# Patient Record
Sex: MALE | Race: BLACK OR AFRICAN AMERICAN | Employment: UNEMPLOYED | URBAN - METROPOLITAN AREA
[De-identification: names, ages, dates, MRNs, and addresses within clinical notes are randomized per-mention and may not be internally consistent; named-entity substitution may affect disease eponyms.]

---
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
## 2024-06-05 ENCOUNTER — OFFICE VISIT (OUTPATIENT)
Dept: PEDIATRICS | Facility: CLINIC | Age: 19
End: 2024-06-05
Payer: COMMERCIAL

## 2024-06-05 VITALS
OXYGEN SATURATION: 99 % | TEMPERATURE: 98.4 F | DIASTOLIC BLOOD PRESSURE: 70 MMHG | HEART RATE: 66 BPM | SYSTOLIC BLOOD PRESSURE: 117 MMHG | WEIGHT: 183.6 LBS

## 2024-06-05 DIAGNOSIS — Z71.84 TRAVEL ADVICE ENCOUNTER: Primary | ICD-10-CM

## 2024-06-05 PROCEDURE — 99401 PREV MED CNSL INDIV APPRX 15: CPT | Mod: 25 | Performed by: PEDIATRICS

## 2024-06-05 PROCEDURE — 90471 IMMUNIZATION ADMIN: CPT | Performed by: PEDIATRICS

## 2024-06-05 PROCEDURE — 90746 HEPB VACCINE 3 DOSE ADULT IM: CPT | Performed by: PEDIATRICS

## 2024-06-05 PROCEDURE — 90619 MENACWY-TT VACCINE IM: CPT | Mod: SL | Performed by: PEDIATRICS

## 2024-06-05 PROCEDURE — 90691 TYPHOID VACCINE IM: CPT | Performed by: PEDIATRICS

## 2024-06-05 PROCEDURE — 90472 IMMUNIZATION ADMIN EACH ADD: CPT | Mod: SL | Performed by: PEDIATRICS

## 2024-06-05 RX ORDER — ATOVAQUONE AND PROGUANIL HYDROCHLORIDE 250; 100 MG/1; MG/1
1 TABLET, FILM COATED ORAL DAILY
Qty: 75 TABLET | Refills: 0 | Status: SHIPPED | OUTPATIENT
Start: 2024-06-05

## 2024-06-05 NOTE — PROGRESS NOTES
Assessment & Plan     Travel advice encounter  [facility] website [employer] health reviewed.  The following vaccines are recommended.  - atovaquone-proguanil (MALARONE) 250-100 MG tablet; Take 1 tablet by mouth daily Start 2 days before travel and continue 7 days after return.    75 tablets are necessary for the duration of this trip.  - HEPATITIS B VACCINE (20 YEARS AND OLDER) (ENGERIX-B/RECOMBIVAX) -perhaps he was vaccinated as an infant, but were simply missing the records.  If that is the case, no additional vaccines will be needed to complete the series.  Dad will check at home.  - MENINGOCOCCAL ACWY >2Y (MENQUADFI )  - TYPHOID VACCINE, IM        15 minutes spent by me on the date of the encounter doing chart review, history and exam, documentation and further activities per the note          Subjective   Roslyn is a 19 year old, presenting for the following health issues:  [facility]      6/5/2024    10:17 AM   Additional Questions   Roomed by Mayi   Accompanied by Dad and brother     History of Present Illness       Reason for visit:  Shots    He eats 2-3 servings of fruits and vegetables daily.He consumes 1 sweetened beverage(s) daily.He exercises with enough effort to increase his heart rate 20 to 29 minutes per day.  He exercises with enough effort to increase his heart rate 4 days per week.   He is taking medications regularly.     Roslyn is here with his brother and dad for pretravel vaccination visit..They are planning to go to [facility], spending time in the cities of [place] [facility] [place] and [place].   They will be leaving on   6/13/24 or 6/14/24 and staying for 2 months with family.    He is previously healthy, with no major problems reported.          Objective    /70   Pulse 66   Temp 98.4  F (36.9  C) (Tympanic)   Wt 183 lb 9.6 oz (83.3 kg)   SpO2 99%   There is no height or weight on file to calculate BMI.  Physical Exam   GENERAL: alert and no distress  EYES: Eyes grossly normal to inspection.  No  discharge or erythema, or obvious scleral/conjunctival abnormalities.  RESP: No audible wheeze, cough, or visible cyanosis.    SKIN: Visible skin clear. No significant rash, abnormal pigmentation or lesions.  NEURO: Cranial nerves grossly intact.  Mentation and speech appropriate for age.  PSYCH: Appropriate affect, tone, and pace of words              Signed Electronically by: Yesenia Deleon MD

## 2024-06-12 ENCOUNTER — TELEPHONE (OUTPATIENT)
Dept: PEDIATRICS | Facility: CLINIC | Age: 19
End: 2024-06-12
Payer: COMMERCIAL

## 2024-06-12 NOTE — TELEPHONE ENCOUNTER
Pt's father called the clinic.    He states that insurance is needing the doctor to addend her note and add that all 75 tablets are necessary (for atovaquone-proguanil (MALARONE) 250-100 MG tablet ) to be dispensed.     Routing to provider HP as he states they leave on their trip tomorrow.     Pt's father would like a call back when this is completed.    Can we leave a detailed message on this number? YES  Phone number patient can be reached at: Home number on file [phone] (home)    Tatiana Hector, RN  [facility]

## 2024-06-12 NOTE — TELEPHONE ENCOUNTER
Reviewed notes with pt's dad. Reviewed pharmacy info. Verbalized understanding and agrees to plan of care.